# Patient Record
Sex: MALE | Race: WHITE | Employment: OTHER | URBAN - METROPOLITAN AREA
[De-identification: names, ages, dates, MRNs, and addresses within clinical notes are randomized per-mention and may not be internally consistent; named-entity substitution may affect disease eponyms.]

---

## 2021-01-06 NOTE — PROGRESS NOTES
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 Present Illness: Patient is a 79 y.o.  male presents to his dermatologist with a mole on the scalp. Lesion has been present for 6 months. It has recently unchanged in size. It has been bleeding. The lesion has not been pruritic. Patient does not have any other lesions of concern. 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 Patient does have a personal history of melanoma. Oly Limb does not have significant subcutaneous mass, swelling in the neck or axilla, cough, abdominal pain, jaundice, blood in stools, headaches, recent neurological changes or bone pain to indicate any metastatic disease. Past Medical History:   Diagnosis Date    Hernia     Leukemia ([facility] [address]     Melanoma ([facility] [address]      Past Surgical History:   Procedure Laterality Date    BONE MARROW BIOPSY       Social  Social History     Tobacco Use    Smoking status: Never Smoker   Substance Use Topics    Alcohol use: Not on file    Drug use: Not on file     Family History   Problem Relation Age of Onset    Cancer Brother     Parkinsonism Brother     Cancer Maternal Grandfather     Cancer Paternal Grandfather      Allergies: Patient has no known allergies. Current Outpatient Medications   Medication Sig Dispense Refill    aspirin 81 MG tablet Take 81 mg by mouth daily.  lisinopril (PRINIVIL;ZESTRIL) 20 MG tablet Take 20 mg by mouth daily.  metoprolol (TOPROL-XL) 100 MG XL tablet Take 100 mg by mouth daily. No current facility-administered medications for this visit. Review of Systems: See HPI, all other systems reviewed and are negative.     Physical Examination:    BP (!) 144/73 (Site: Right Upper Arm, Position: Sitting, Cuff Size: Small Adult)   Pulse 60   Temp 97.3 °F (36.3 °C) (Temporal)   Ht 6' 3\" (1.905 m)   Wt 259 lb (117.5 kg)   SpO2 99%   BMI 32.37 kg/m²     General:  Alert, oriented x 3, cooperative, no distress, appears stated age. Skin: Skin color, texture, turgor normal. There is a biopsy site on his frontal scalp. There are no surrounding areas of nodularity or pigmentation. Other suspicious skin lesions: no.   Lymph nodes: Cervical, supraclavicular, and axillary nodes normal.   HENT:  Normocephalic, without obvious abnormality. Moist mucus membranes. No icterus. Neck: Supple, symmetrical, trachea midline, no thyromegaly. Back:   Symmetric, no curvature. No CVA tenderness. Lungs:   No respiratory distress. Chest wall:  No tenderness or deformity. No chest pain or S. O.B   Heart:  Regular rate and rhythm by peripheral pulses. Abdomen:   Soft, non-tender. No masses,  No organomegaly. Extremities: Extremities normal, atraumatic, no cyanosis or bilateral edema. Neurologic: Grossly intact sensory and motor exam.   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 Plan: The natural history, prognosis and treatment of leiomyosarcoma were discussed. The discussion included rarity of tumor. The discussion of the treatment included lack of clear standard of care complete discussion. It was felt that this discussion was necessary because of the anxiety induced by the diagnosis and the complex information that the patient receive when they attempt to educate themselves by the public sources and/or the internet. The lesion can be treated with: Wide Excision under anesthesia. I explained patient about the surgery. All the complications including wound issues were explained. Risks, benefits and alternatives of surgery explained to the patient and patient wishes to proceed with surgery.  Adjuvant management will depend on final pathology. All the questions of the patient are answered to his apparent satisfaction. Patient verbalized understanding of the management plan. Pathology reviewed with the patient and his wife. Discussed possibly obtaining an MRI of the scalp. Discussed a CT scan of the neck and Chest.  Discussed obtaining a pre-op physical.  Follow up with Dr. Dave Aldridge. Susan Velez MD  Surgery Attending    I, Lionel Jones RN, am scribing for and in the presence of Dr Susan Velez. Lionel Jones RN    I, Dr. Susan Velez, personally performed the services described in this documentation as scribed by Lionel Jones RN in my presence, and it is both accurate and complete.      Susan Velez MD  Surgery Attending

## 2021-01-08 ENCOUNTER — OFFICE VISIT (OUTPATIENT)
Dept: SURGERY | Age: 71
End: 2021-01-08
Payer: MEDICARE

## 2021-01-08 VITALS
HEART RATE: 60 BPM | WEIGHT: 259 LBS | OXYGEN SATURATION: 99 % | TEMPERATURE: 97.3 F | HEIGHT: 75 IN | BODY MASS INDEX: 32.2 KG/M2 | SYSTOLIC BLOOD PRESSURE: 144 MMHG | DIASTOLIC BLOOD PRESSURE: 73 MMHG

## 2021-01-08 DIAGNOSIS — C80.1 MALIGNANT SPINDLE CELL NEOPLASM (HCC): Primary | ICD-10-CM

## 2021-01-08 PROCEDURE — 99205 OFFICE O/P NEW HI 60 MIN: CPT | Performed by: SURGERY

## 2021-01-08 NOTE — LETTER
[facility]) Surgical Oncology and General Surgery   [address] [place] [address] [phone] MD Haider    SURGERY ORDER -- 21    Facility:  [facility]. # _________________                                  Scheduled by: ____________    Surgery Date & Time: Friday January 15 th 10:00                                             Nuc Med / Inj. - or - Needle/Seed Loc:                    Pt arrival:  08:00    Patient Name: Roxy Olivares             :                1950           PCP:                 Yasmin Parker DO   Home Ph:         [phone] (home)                                                     PROCEDURE: Scalp sarcoma resection possible full thickness skin graft. [id number], [id number]    DIAGNOSIS:     ICD-10-CM    1. Malignant spindle cell neoplasm (HCC)  C80.1      Anesthesia: _GA_ Time Needed: _90 mins_Pt Position: _supine_         Outpatient _Y_ Admit __  Assist._____  Pre-Op H & P to be done by: PCP      Labs Needed: CBC [] PT/PTT []  INR [] CMP [] EKG [] CXR [] Urine Hcg []     Cardiac Clearance ___  (if Xd  to be done by) __________________    Medications to be stopped 5 days before surgery: Aspirin    Additional/Special Orders:    Ancef 2gm IV risa Watson MD  2021 10:03 AM

## 2021-01-11 NOTE — PROGRESS NOTES
[facility] / [facility] ([facility]) [address] [facility], [address]    Acknowledgment of Informed Consent for Surgical or Medical Procedure and Sedation  I agree to allow doctor(s) Elle Willett and his/her associates or assistants, including residents and/or other qualified medical practitioner to perform the following medical treatment or procedure and to administer or direct the administration of sedation as necessary:  Procedure(s): SCALP SARCOMA RESECTION POSSIBLE FULL THICKNESS SKIN GRAFT  My doctor has explained the following regarding the proposed procedure:  ? the explanation of the procedure  ? the benefits of the procedure  ? the potential problems that might occur during recuperation  ? the risks and side effects of the procedure which could include but are not limited to severe blood loss, infection, stroke or death  ? the benefits, risks and side effect of alternative procedures including the consequences of declining this procedure or any alternative procedures  ? the likelihood of achieving satisfactory results. I acknowledge no guarantee or assurance has been made to me regarding the results. I understand that during the course of this treatment/procedure, unforeseen conditions can occur which require an additional or different procedure. I agree to allow my physician or assistants to perform such extension of the original procedure as they may find necessary. I understand that sedation will often result in temporary impairment of memory and fine motor skills and that sedation can occasionally progress to a state of deep sedation or general anesthesia. I understand the risks of anesthesia for surgery include, but are not limited to, sore throat, hoarseness, injury to face, mouth, or teeth; nausea; headache; injury to blood vessels or nerves; death, brain damage, or paralysis.

## 2021-01-12 ENCOUNTER — OFFICE VISIT (OUTPATIENT)
Dept: PRIMARY CARE CLINIC | Age: 71
End: 2021-01-12
Payer: MEDICARE

## 2021-01-12 DIAGNOSIS — Z01.818 PRE-OP EXAMINATION: Primary | ICD-10-CM

## 2021-01-12 LAB — SARS-COV-2: NOT DETECTED

## 2021-01-12 PROCEDURE — 99421 OL DIG E/M SVC 5-10 MIN: CPT | Performed by: NURSE PRACTITIONER

## 2021-01-12 RX ORDER — METOPROLOL SUCCINATE 50 MG/1
TABLET, EXTENDED RELEASE ORAL
COMMUNITY
Start: 2020-11-28

## 2021-01-12 RX ORDER — LISINOPRIL AND HYDROCHLOROTHIAZIDE 25; 20 MG/1; MG/1
TABLET ORAL
COMMUNITY
Start: 2020-11-28

## 2021-01-12 NOTE — PROGRESS NOTES
Pt to be discharge to wife or son. Pt had COVID test 1/12 and states will quarantine until after surgery.

## 2021-01-12 NOTE — PROGRESS NOTES
4. Please call [phone] option #2 option #2 if you have not been preregistered yet. On the day of your procedure bring your insurance card and photo ID. You will be registered at your bedside once brought back to your room. 5. DO NOT EAT ANYTHING eight hours prior to surgery. May have 8 ounces of water 4 hours prior to surgery. 6. MEDICATIONS   ? Take the following medications with a SMALL sip of water:   ? Use your usual dose of inhalers the morning of surgery. BRING your rescue inhaler with you to hospital.   ? Anesthesia does NOT want you to take insulin the morning of surgery. They will control your blood sugar while you are at the hospital. Please contact your ordering physician for instructions regarding your insulin the night before your procedure. If you have an insulin pump, please keep it set on basal rate. 7. Do not swallow water when brushing teeth. No gum, candy, mints or ice chips. Refrain from smoking or at least decrease the amount. 8. Dress in loose, comfortable clothing appropriate for redressing after your procedure. Do not wear jewelry (including body piercings), make-up (especially NO eye make-up), fingernail polish (NO toenail polish if foot/leg surgery), lotion, powders or metal hairclips. 9. Dentures, glasses, or contacts will need to be removed before your procedure. Bring cases for your glasses, contacts, dentures, or hearing aids to protect them while you are in surgery. 10. If you use a CPAP, please bring it with you on the day of your procedure. 11. We recommend that valuable personal  belongings such as cash, cell phones, e-tablets or jewelry, be left at home during your stay. The hospital will not be responsible for valuables that are not secured in the hospital safe. However, if your insurance requires a co-pay, you may want to bring a method of payment, i.e. Check or credit card, if you wish to pay your co-pay the day of surgery. 12. If you are to stay overnight, you may bring a bag with personal items. Please have any large items you may need brought in by your family after your arrival to your hospital room. 15. If you have a Living Will or Durable Power of , please bring a copy on the day of your procedure. 15. With your permission, one family member may accompany you while you are being prepared for surgery. Once you are ready, additional family members may join you. HOW WE KEEP YOU SAFE and WORK TO PREVENT SURGICAL SITE INFECTIONS:  1. Health care workers should always check your ID bracelet to verify your name and birth date. You will be asked many times to state your name, date of birth, and allergies. 2. Health care workers should always clean their hands with soap or alcohol gel before providing care to you. It is okay to ask anyone if they cleaned their hands before they touch you. 3. You will be actively involved in verifying the type of procedure you are having and ensuring the correct surgical site. This will be confirmed multiple times prior to your procedure. Do NOT nohelia your surgery site UNLESS instructed to by your surgeon. 4. Do not shave or wax for 72 hours prior to procedure near your operative site. Shaving with a razor can irritate your skin and make it easier to develop an infection. On the day of your procedure, any hair that needs to be removed near the surgical site will be clipped by a healthcare worker using a special clippers designed to avoid skin irritation. 5. When you are in the operating room, your surgical site will be cleansed with a special soap, and in most cases, you will be given an antibiotic before the surgery begins.       What to expect AFTER YOUR PROCEDURE:

## 2021-01-14 ENCOUNTER — ANESTHESIA EVENT (OUTPATIENT)
Dept: OPERATING ROOM | Age: 71
End: 2021-01-14
Payer: MEDICARE

## 2021-01-15 ENCOUNTER — HOSPITAL ENCOUNTER (OUTPATIENT)
Age: 71
Setting detail: OUTPATIENT SURGERY
Discharge: HOME | End: 2021-01-15
Attending: SURGERY | Admitting: SURGERY
Payer: MEDICARE

## 2021-01-15 ENCOUNTER — ANESTHESIA (OUTPATIENT)
Dept: OPERATING ROOM | Age: 71
End: 2021-01-15
Payer: MEDICARE

## 2021-01-15 VITALS
BODY MASS INDEX: 32.08 KG/M2 | RESPIRATION RATE: 18 BRPM | SYSTOLIC BLOOD PRESSURE: 108 MMHG | HEART RATE: 75 BPM | OXYGEN SATURATION: 97 % | TEMPERATURE: 96.2 F | DIASTOLIC BLOOD PRESSURE: 71 MMHG | HEIGHT: 75 IN | WEIGHT: 258 LBS

## 2021-01-15 VITALS
TEMPERATURE: 97.3 F | RESPIRATION RATE: 18 BRPM | OXYGEN SATURATION: 98 % | DIASTOLIC BLOOD PRESSURE: 65 MMHG | SYSTOLIC BLOOD PRESSURE: 108 MMHG

## 2021-01-15 DIAGNOSIS — C80.1 MALIGNANT TUMOR, SPINDLE CELL TYPE (HCC): ICD-10-CM

## 2021-01-15 PROCEDURE — 97605 NEG PRS WND THER DME<=50SQCM: CPT | Performed by: SURGERY

## 2021-01-15 PROCEDURE — 2709999900 HC NON-CHARGEABLE SUPPLY: Performed by: SURGERY

## 2021-01-15 PROCEDURE — 3600000004 HC SURGERY LEVEL 4 BASE: Performed by: SURGERY

## 2021-01-15 PROCEDURE — 13101 CMPLX RPR TRUNK 2.6-7.5 CM: CPT | Performed by: SURGERY

## 2021-01-15 PROCEDURE — 15240 FTH/GFT F/C/C/M/N/AX/G/H/F20: CPT | Performed by: SURGERY

## 2021-01-15 PROCEDURE — 3700000000 HC ANESTHESIA ATTENDED CARE: Performed by: SURGERY

## 2021-01-15 PROCEDURE — 2500000003 HC RX 250 WO HCPCS: Performed by: SURGERY

## 2021-01-15 PROCEDURE — 2580000003 HC RX 258: Performed by: ANESTHESIOLOGY

## 2021-01-15 PROCEDURE — 88341 IMHCHEM/IMCYTCHM EA ADD ANTB: CPT

## 2021-01-15 PROCEDURE — 7100000001 HC PACU RECOVERY - ADDTL 15 MIN: Performed by: SURGERY

## 2021-01-15 PROCEDURE — 13102 CMPLX RPR TRUNK ADDL 5CM/<: CPT | Performed by: SURGERY

## 2021-01-15 PROCEDURE — 2580000003 HC RX 258: Performed by: SURGERY

## 2021-01-15 PROCEDURE — 6360000002 HC RX W HCPCS: Performed by: NURSE ANESTHETIST, CERTIFIED REGISTERED

## 2021-01-15 PROCEDURE — 88342 IMHCHEM/IMCYTCHM 1ST ANTB: CPT

## 2021-01-15 PROCEDURE — 15241 FTH/GFT F/C/C/M/N/A/G/H/F EA: CPT | Performed by: SURGERY

## 2021-01-15 PROCEDURE — 2500000003 HC RX 250 WO HCPCS: Performed by: NURSE ANESTHETIST, CERTIFIED REGISTERED

## 2021-01-15 PROCEDURE — 6360000002 HC RX W HCPCS: Performed by: SURGERY

## 2021-01-15 PROCEDURE — 7100000011 HC PHASE II RECOVERY - ADDTL 15 MIN: Performed by: SURGERY

## 2021-01-15 PROCEDURE — 3700000001 HC ADD 15 MINUTES (ANESTHESIA): Performed by: SURGERY

## 2021-01-15 PROCEDURE — 2720000010 HC SURG SUPPLY STERILE: Performed by: SURGERY

## 2021-01-15 PROCEDURE — 21016 RESECT FACE/SCALP TUM 2 CM/>: CPT | Performed by: SURGERY

## 2021-01-15 PROCEDURE — 88305 TISSUE EXAM BY PATHOLOGIST: CPT

## 2021-01-15 PROCEDURE — 7100000000 HC PACU RECOVERY - FIRST 15 MIN: Performed by: SURGERY

## 2021-01-15 PROCEDURE — 7100000010 HC PHASE II RECOVERY - FIRST 15 MIN: Performed by: SURGERY

## 2021-01-15 PROCEDURE — 3600000014 HC SURGERY LEVEL 4 ADDTL 15MIN: Performed by: SURGERY

## 2021-01-15 RX ORDER — SODIUM CHLORIDE 0.9 % (FLUSH) 0.9 %
10 SYRINGE (ML) INJECTION EVERY 12 HOURS SCHEDULED
Status: DISCONTINUED | OUTPATIENT
Start: 2021-01-15 | End: 2021-01-15 | Stop reason: HOSPADM

## 2021-01-15 RX ORDER — SODIUM CHLORIDE 0.9 % (FLUSH) 0.9 %
10 SYRINGE (ML) INJECTION PRN
Status: DISCONTINUED | OUTPATIENT
Start: 2021-01-15 | End: 2021-01-15 | Stop reason: HOSPADM

## 2021-01-15 RX ORDER — SODIUM CHLORIDE 9 MG/ML
INJECTION, SOLUTION INTRAVENOUS CONTINUOUS
Status: DISCONTINUED | OUTPATIENT
Start: 2021-01-15 | End: 2021-01-15 | Stop reason: HOSPADM

## 2021-01-15 RX ORDER — MAGNESIUM HYDROXIDE 1200 MG/15ML
LIQUID ORAL CONTINUOUS PRN
Status: COMPLETED | OUTPATIENT
Start: 2021-01-15 | End: 2021-01-15

## 2021-01-15 RX ORDER — FENTANYL CITRATE 50 UG/ML
INJECTION, SOLUTION INTRAMUSCULAR; INTRAVENOUS PRN
Status: DISCONTINUED | OUTPATIENT
Start: 2021-01-15 | End: 2021-01-15 | Stop reason: SDUPTHER

## 2021-01-15 RX ORDER — HYDRALAZINE HYDROCHLORIDE 20 MG/ML
5 INJECTION INTRAMUSCULAR; INTRAVENOUS EVERY 5 MIN PRN
Status: CANCELLED | OUTPATIENT
Start: 2021-01-15

## 2021-01-15 RX ORDER — CEFAZOLIN SODIUM 2 G/50ML
2 SOLUTION INTRAVENOUS ONCE
Status: COMPLETED | OUTPATIENT
Start: 2021-01-15 | End: 2021-01-15

## 2021-01-15 RX ORDER — SODIUM CHLORIDE, SODIUM LACTATE, POTASSIUM CHLORIDE, CALCIUM CHLORIDE 600; 310; 30; 20 MG/100ML; MG/100ML; MG/100ML; MG/100ML
INJECTION, SOLUTION INTRAVENOUS CONTINUOUS
Status: DISCONTINUED | OUTPATIENT
Start: 2021-01-15 | End: 2021-01-15 | Stop reason: HOSPADM

## 2021-01-15 RX ORDER — LIDOCAINE HYDROCHLORIDE 10 MG/ML
1 INJECTION, SOLUTION EPIDURAL; INFILTRATION; INTRACAUDAL; PERINEURAL
Status: DISCONTINUED | OUTPATIENT
Start: 2021-01-15 | End: 2021-01-15 | Stop reason: HOSPADM

## 2021-01-15 RX ORDER — PROPOFOL 10 MG/ML
INJECTION, EMULSION INTRAVENOUS PRN
Status: DISCONTINUED | OUTPATIENT
Start: 2021-01-15 | End: 2021-01-15 | Stop reason: SDUPTHER

## 2021-01-15 RX ORDER — FENTANYL CITRATE 50 UG/ML
50 INJECTION, SOLUTION INTRAMUSCULAR; INTRAVENOUS EVERY 5 MIN PRN
Status: CANCELLED | OUTPATIENT
Start: 2021-01-15

## 2021-01-15 RX ORDER — ONDANSETRON 2 MG/ML
INJECTION INTRAMUSCULAR; INTRAVENOUS PRN
Status: DISCONTINUED | OUTPATIENT
Start: 2021-01-15 | End: 2021-01-15 | Stop reason: SDUPTHER

## 2021-01-15 RX ORDER — LABETALOL 20 MG/4 ML (5 MG/ML) INTRAVENOUS SYRINGE
5 EVERY 10 MIN PRN
Status: CANCELLED | OUTPATIENT
Start: 2021-01-15

## 2021-01-15 RX ORDER — OXYCODONE HYDROCHLORIDE AND ACETAMINOPHEN 5; 325 MG/1; MG/1
1 TABLET ORAL EVERY 6 HOURS PRN
Qty: 16 TABLET | Refills: 0 | Status: SHIPPED | OUTPATIENT
Start: 2021-01-15 | End: 2021-01-19

## 2021-01-15 RX ORDER — GLYCOPYRROLATE 0.2 MG/ML
INJECTION INTRAMUSCULAR; INTRAVENOUS PRN
Status: DISCONTINUED | OUTPATIENT
Start: 2021-01-15 | End: 2021-01-15 | Stop reason: SDUPTHER

## 2021-01-15 RX ORDER — ENALAPRILAT 2.5 MG/2ML
1.25 INJECTION INTRAVENOUS
Status: CANCELLED | OUTPATIENT
Start: 2021-01-15 | End: 2021-01-15

## 2021-01-15 RX ORDER — ROCURONIUM BROMIDE 10 MG/ML
INJECTION, SOLUTION INTRAVENOUS PRN
Status: DISCONTINUED | OUTPATIENT
Start: 2021-01-15 | End: 2021-01-15 | Stop reason: SDUPTHER

## 2021-01-15 RX ORDER — LIDOCAINE HYDROCHLORIDE 20 MG/ML
INJECTION, SOLUTION INFILTRATION; PERINEURAL PRN
Status: DISCONTINUED | OUTPATIENT
Start: 2021-01-15 | End: 2021-01-15 | Stop reason: SDUPTHER

## 2021-01-15 RX ORDER — SUCCINYLCHOLINE CHLORIDE 20 MG/ML
INJECTION INTRAMUSCULAR; INTRAVENOUS PRN
Status: DISCONTINUED | OUTPATIENT
Start: 2021-01-15 | End: 2021-01-15 | Stop reason: SDUPTHER

## 2021-01-15 RX ORDER — FENTANYL CITRATE 50 UG/ML
25 INJECTION, SOLUTION INTRAMUSCULAR; INTRAVENOUS EVERY 5 MIN PRN
Status: CANCELLED | OUTPATIENT
Start: 2021-01-15

## 2021-01-15 RX ORDER — DEXAMETHASONE SODIUM PHOSPHATE 4 MG/ML
INJECTION, SOLUTION INTRA-ARTICULAR; INTRALESIONAL; INTRAMUSCULAR; INTRAVENOUS; SOFT TISSUE PRN
Status: DISCONTINUED | OUTPATIENT
Start: 2021-01-15 | End: 2021-01-15 | Stop reason: SDUPTHER

## 2021-01-15 RX ORDER — BUPIVACAINE HYDROCHLORIDE AND EPINEPHRINE 5; 5 MG/ML; UG/ML
INJECTION, SOLUTION EPIDURAL; INTRACAUDAL; PERINEURAL PRN
Status: DISCONTINUED | OUTPATIENT
Start: 2021-01-15 | End: 2021-01-15 | Stop reason: ALTCHOICE

## 2021-01-15 RX ORDER — ONDANSETRON 2 MG/ML
4 INJECTION INTRAMUSCULAR; INTRAVENOUS
Status: CANCELLED | OUTPATIENT
Start: 2021-01-15 | End: 2021-01-15

## 2021-01-15 RX ADMIN — SODIUM CHLORIDE, POTASSIUM CHLORIDE, SODIUM LACTATE AND CALCIUM CHLORIDE: 600; 310; 30; 20 INJECTION, SOLUTION INTRAVENOUS at 08:42

## 2021-01-15 RX ADMIN — PHENYLEPHRINE HYDROCHLORIDE 80 MCG: 10 INJECTION, SOLUTION INTRAMUSCULAR; INTRAVENOUS; SUBCUTANEOUS at 10:29

## 2021-01-15 RX ADMIN — ONDANSETRON 4 MG: 2 INJECTION INTRAMUSCULAR; INTRAVENOUS at 10:24

## 2021-01-15 RX ADMIN — DEXAMETHASONE SODIUM PHOSPHATE 8 MG: 4 INJECTION, SOLUTION INTRAMUSCULAR; INTRAVENOUS at 10:24

## 2021-01-15 RX ADMIN — PHENYLEPHRINE HYDROCHLORIDE 80 MCG: 10 INJECTION, SOLUTION INTRAMUSCULAR; INTRAVENOUS; SUBCUTANEOUS at 11:26

## 2021-01-15 RX ADMIN — SUGAMMADEX 234 MG: 100 INJECTION, SOLUTION INTRAVENOUS at 11:37

## 2021-01-15 RX ADMIN — ROCURONIUM BROMIDE 20 MG: 10 INJECTION INTRAVENOUS at 10:24

## 2021-01-15 RX ADMIN — FAMOTIDINE 20 MG: 10 INJECTION, SOLUTION INTRAVENOUS at 10:24

## 2021-01-15 RX ADMIN — SODIUM CHLORIDE, POTASSIUM CHLORIDE, SODIUM LACTATE AND CALCIUM CHLORIDE: 600; 310; 30; 20 INJECTION, SOLUTION INTRAVENOUS at 11:45

## 2021-01-15 RX ADMIN — PHENYLEPHRINE HYDROCHLORIDE 100 MCG: 10 INJECTION, SOLUTION INTRAMUSCULAR; INTRAVENOUS; SUBCUTANEOUS at 10:48

## 2021-01-15 RX ADMIN — GLYCOPYRROLATE 0.4 MG: 0.2 INJECTION INTRAMUSCULAR; INTRAVENOUS at 10:44

## 2021-01-15 RX ADMIN — ROCURONIUM BROMIDE 30 MG: 10 INJECTION INTRAVENOUS at 10:39

## 2021-01-15 RX ADMIN — PROPOFOL 200 MG: 10 INJECTION, EMULSION INTRAVENOUS at 10:24

## 2021-01-15 RX ADMIN — FENTANYL CITRATE 200 MCG: 50 INJECTION INTRAMUSCULAR; INTRAVENOUS at 10:29

## 2021-01-15 RX ADMIN — GLYCOPYRROLATE 0.2 MG: 0.2 INJECTION INTRAMUSCULAR; INTRAVENOUS at 10:29

## 2021-01-15 RX ADMIN — SUCCINYLCHOLINE CHLORIDE 200 MG: 20 INJECTION, SOLUTION INTRAMUSCULAR; INTRAVENOUS; PARENTERAL at 10:26

## 2021-01-15 RX ADMIN — CEFAZOLIN SODIUM 2 G: 2 SOLUTION INTRAVENOUS at 10:36

## 2021-01-15 RX ADMIN — LIDOCAINE HYDROCHLORIDE 100 MG: 20 INJECTION, SOLUTION INFILTRATION; PERINEURAL at 10:24

## 2021-01-15 RX ADMIN — PROPOFOL 100 MG: 10 INJECTION, EMULSION INTRAVENOUS at 11:26

## 2021-01-15 RX ADMIN — GLYCOPYRROLATE 0.2 MG: 0.2 INJECTION INTRAMUSCULAR; INTRAVENOUS at 11:00

## 2021-01-15 RX ADMIN — PHENYLEPHRINE HYDROCHLORIDE 80 MCG: 10 INJECTION, SOLUTION INTRAMUSCULAR; INTRAVENOUS; SUBCUTANEOUS at 10:38

## 2021-01-15 RX ADMIN — PHENYLEPHRINE HYDROCHLORIDE 100 MCG: 10 INJECTION, SOLUTION INTRAMUSCULAR; INTRAVENOUS; SUBCUTANEOUS at 11:00

## 2021-01-15 ASSESSMENT — PULMONARY FUNCTION TESTS
PIF_VALUE: 21
PIF_VALUE: 18
PIF_VALUE: 20
PIF_VALUE: 17
PIF_VALUE: 19
PIF_VALUE: 27
PIF_VALUE: 3
PIF_VALUE: 18
PIF_VALUE: 19
PIF_VALUE: 24
PIF_VALUE: 16
PIF_VALUE: 20
PIF_VALUE: 17
PIF_VALUE: 20
PIF_VALUE: 20
PIF_VALUE: 19
PIF_VALUE: 21
PIF_VALUE: 20
PIF_VALUE: 21
PIF_VALUE: 21
PIF_VALUE: 20
PIF_VALUE: 18
PIF_VALUE: 18
PIF_VALUE: 21
PIF_VALUE: 18
PIF_VALUE: 20
PIF_VALUE: 6
PIF_VALUE: 18
PIF_VALUE: 19
PIF_VALUE: 18
PIF_VALUE: 21
PIF_VALUE: 21
PIF_VALUE: 20
PIF_VALUE: 21
PIF_VALUE: 18
PIF_VALUE: 18
PIF_VALUE: 20
PIF_VALUE: 19
PIF_VALUE: 9
PIF_VALUE: 18
PIF_VALUE: 19
PIF_VALUE: 19
PIF_VALUE: 20
PIF_VALUE: 19
PIF_VALUE: 19
PIF_VALUE: 41

## 2021-01-15 ASSESSMENT — PAIN SCALES - GENERAL: PAINLEVEL_OUTOF10: 0

## 2021-01-15 NOTE — H&P
Maximo Augustine    [phone]    [facility] Same Day Surgery Update H & P  Department of General Surgery   Surgical Service   Pre-operative History and Physical  Last H & P within the last 30 days. DIAGNOSIS:   Malignant tumor, spindle cell type ([address] [C80.1]    Procedure(s):  SCALP SARCOMA RESECTION POSSIBLE FULL THICKNESS SKIN GRAFT     HISTORY OF PRESENT ILLNESS:   Patient with malignant dermal spindle cell neoplasm of the scalp presents today for the above procedure. Covid 19:  Patient denies fever, chills, cough or known exposure to Covid-19. Past Medical History:        Diagnosis Date    Hernia     Hypertension     Leukemia ([address]     Melanoma ([facility] [address]     Sleep apnea     uses CPAP    Tachycardia     Stress induced     Past Surgical History:        Procedure Laterality Date    ACHILLES TENDON SURGERY      BONE MARROW BIOPSY      COLONOSCOPY      HERNIA REPAIR      Inguinal    SHOULDER SURGERY Right     TOE SURGERY Left     TONSILLECTOMY       Past Social History:  Social History     Socioeconomic History    Marital status:      Spouse name: None    Number of children: None    Years of education: None    Highest education level: None   Occupational History    None   Social Needs    Financial resource strain: None    Food insecurity     Worry: None     Inability: None    Transportation needs     Medical: None     Non-medical: None   Tobacco Use    Smoking status: Never Smoker    Smokeless tobacco: Never Used   Substance and Sexual Activity    Alcohol use:  Yes     Alcohol/week: 1.0 standard drinks     Types: 1 Cans of beer per week     Comment: Daily    Drug use: Never    Sexual activity: None   Lifestyle    Physical activity     Days per week: None     Minutes per session: None    Stress: None   Relationships    Social connections     Talks on phone: None     Gets together: None     Attends [employer] service: None     Active member of club or organization: None

## 2021-01-15 NOTE — PROGRESS NOTES
Pt arrived form OR, s/p SCALP SARCOMA RADICAL RESECTION, FULL THICKNESS SKIN GRAFT, report received from CRNA and surgical resident.   Hob elevated, ice chips given

## 2021-01-15 NOTE — ANESTHESIA PRE PROCEDURE
Department of Anesthesiology  Preprocedure Note       Name:  Barba Schlatter   Age:  79 y.o.  :  1950                                          MRN:  [id number]         Date:  1/15/2021      Surgeon: Perez Arias):  Halle Lovell MD    Procedure: Procedure(s):  SCALP SARCOMA RESECTION POSSIBLE FULL THICKNESS SKIN GRAFT    Medications prior to admission:   Prior to Admission medications    Medication Sig Start Date End Date Taking? Authorizing Provider   lisinopril-hydroCHLOROthiazide (PRINZIDE;ZESTORETIC) 20-25 MG per tablet  20  Yes Historical Provider, MD   metoprolol succinate (TOPROL XL) 50 MG extended release tablet  20  Yes Historical Provider, MD   aspirin 81 MG tablet Take 81 mg by mouth daily.    Yes Historical Provider, MD       Current medications:    Current Facility-Administered Medications   Medication Dose Route Frequency Provider Last Rate Last Admin    ceFAZolin (ANCEF) 2 g in dextrose 3 % 50 mL IVPB (duplex)  2 g Intravenous Once Halle Lovell MD        sodium chloride flush 0.9 % injection 10 mL  10 mL Intravenous 2 times per day Josué Dalia, DO        sodium chloride flush 0.9 % injection 10 mL  10 mL Intravenous PRN Josué Dalia, DO        0.9 % sodium chloride infusion   Intravenous Continuous Josué Dalia, DO        lactated ringers infusion   Intravenous Continuous Josué Dalia, DO        lactated ringers infusion   Intravenous Continuous Matti Cervantes MD        sodium chloride flush 0.9 % injection 10 mL  10 mL Intravenous 2 times per day Matti Cervantes MD        sodium chloride flush 0.9 % injection 10 mL  10 mL Intravenous PRN Matti Cervantes MD        lidocaine PF 1 % injection 1 mL  1 mL Intradermal Once PRN Matti Cervantes MD           Allergies:  No Known Allergies    Problem List:    Patient Active Problem List   Diagnosis Code    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       Past Medical History:        Diagnosis Date    Hernia     Hypertension     [facility] ([facility]     Melanoma ([facility]     Sleep apnea     uses CPAP    Tachycardia     Stress induced       Past Surgical History:        Procedure Laterality Date    ACHILLES TENDON SURGERY      BONE MARROW BIOPSY      COLONOSCOPY      HERNIA REPAIR      Inguinal    SHOULDER SURGERY Right     TOE SURGERY Left     TONSILLECTOMY         Social History:    Social History     Tobacco Use    Smoking status: Never Smoker    Smokeless tobacco: Never Used   Substance Use Topics    Alcohol use: Yes     Alcohol/week: 1.0 standard drinks     Types: 1 Cans of beer per week     Comment: Daily                                Counseling given: Not Answered      Vital Signs (Current):   Vitals:    01/12/21 1314 01/15/21 0758   BP:  (!) 163/80   Pulse:  61   Resp:  17   Temp:  97.7 °F (36.5 °C)   TempSrc:  Oral   SpO2:  96%   Weight: 258 lb (117 kg) 258 lb (117 kg)   Height: 6' 3\" (1.905 m) 6' 3\" (1.905 m)                                              BP Readings from Last 3 Encounters:   01/15/21 (!) 163/80   01/08/21 (!) 144/73   05/15/17 122/64       NPO Status:                                                                                 BMI:   Wt Readings from Last 3 Encounters:   01/15/21 258 lb (117 kg)   01/08/21 259 lb (117.5 kg)   05/15/17 250 lb (113.4 kg)     Body mass index is 32.25 kg/m².     CBC:   Lab Results   Component Value Date    WBC 5.7 05/15/2017    RBC 4.51 05/15/2017    HGB 13.5 05/15/2017    HCT 42.2 05/15/2017    MCV 93.6 05/15/2017    RDW 12.9 05/15/2017     05/15/2017       CMP:   Lab Results   Component Value Date     05/15/2017    K 3.6 05/15/2017     05/15/2017    CO2 30 05/15/2017    BUN 13 05/15/2017    CREATININE 0.82 05/15/2017    AGRATIO 1.4 05/15/2017    LABGLOM >60.0 05/15/2017    GLUCOSE 115 05/15/2017    PROT 6.9 05/15/2017    CALCIUM 9.1 05/15/2017    BILITOT 1.2 05/15/2017    ALKPHOS 46 05/15/2017    AST 20 05/15/2017    ALT 27 05/15/2017       POC Tests: No

## 2021-01-15 NOTE — BRIEF OP NOTE
Brief Postoperative Note      Patient: Coni Lizarraga  YOB: 1950  MRN: [id number]    Date of Procedure: 1/15/2021    Pre-Op Diagnosis: Malignant tumor, spindle cell type ([facility] [address] [C80.1]    Post-Op Diagnosis: Same       Procedure(s):  SCALP SARCOMA RADICAL RESECTION, FULL THICKNESS SKIN GRAFT    Surgeon(s):  Franca Rowell MD    Assistant:  Resident: Chano Ya MD; Natalie Tejeda DO; Davina Mcclelland MD; Claritza Kelly MD    Anesthesia: General    Estimated Blood Loss (mL): less than 50     Complications: None    Specimens:   ID Type Source Tests Collected by Time Destination   A : SCALP SARCOMA (SHORT ANTERIOR, LONG LEFT LATERAL) Tissue Tissue SURGICAL PATHOLOGY Franca Rowell MD 1/15/2021 1059        Implants:  * No implants in log *      Drains:   Negative Pressure Wound Therapy  Anterior; Upper (Active)       Findings: see op note    Electronically signed by Claritza Kelly MD on 1/15/2021 at 11:34 AM

## 2021-01-15 NOTE — PROGRESS NOTES
Ambulatory Surgery/Procedure Discharge Note    Vitals:    01/15/21 1319   BP: 108/71   Pulse: 75   Resp: 18   Temp: 96.2 °F (35.7 °C)   SpO2: 97%       No intake/output data recorded. Restroom use offered before discharge. yes    Pain assessment:    Pain Level: 0        Patient discharged to home/self care.  Patient discharged via wheel chair by transporter to waiting family/S.O.       1/15/2021 3:03 PM

## 2021-01-18 NOTE — PROGRESS NOTES
Marisel Costa is here for postop evaluation. He is gradually improving since then. Minimal pain now. No other complaints. O/E: Alert, oriented x 3, sitting comfortably with out any discomfort  No respiratory distress    Postop wound - healing well. Complete graft uptake. Path - FINAL DIAGNOSIS:     Skin of scalp, wide excision of previously diagnosed sarcoma:      - Biopsy site adjacent to superficial cutaneous spindled malignancy, 8        mm from inked deep resection margin, in sun damaged skin    A/P: S/P Scalp Sarcoma Radical Resection, Full Thickness Skin Graft 1/15/21, doing well  No postop complications  Path Pending  Follow up with Dr. Isaias Boone  Follow up in 3 months    Kareen Rocha MD  Surgery Attending    I, Jim Padilla RN, am scribing for and in the presence of Dr Kareen Rocha. Jim Padilla RN  I, Dr. Kareen Rocha, personally performed the services described in this documentation as scribed by Jim Padilla RN in my presence, and it is both accurate and complete.      Kareen Rocha MD  Surgery Attending

## 2021-01-19 ENCOUNTER — OFFICE VISIT (OUTPATIENT)
Dept: SURGERY | Age: 71
End: 2021-01-19

## 2021-01-19 VITALS
SYSTOLIC BLOOD PRESSURE: 157 MMHG | HEIGHT: 76 IN | OXYGEN SATURATION: 99 % | HEART RATE: 79 BPM | RESPIRATION RATE: 16 BRPM | WEIGHT: 260 LBS | TEMPERATURE: 96.8 F | BODY MASS INDEX: 31.66 KG/M2 | DIASTOLIC BLOOD PRESSURE: 72 MMHG

## 2021-01-19 DIAGNOSIS — C80.1 MALIGNANT SPINDLE CELL NEOPLASM (HCC): ICD-10-CM

## 2021-01-19 DIAGNOSIS — Z09 POSTOP CHECK: Primary | ICD-10-CM

## 2021-01-19 PROCEDURE — 99024 POSTOP FOLLOW-UP VISIT: CPT | Performed by: SURGERY

## 2021-01-19 RX ORDER — ZINC GLUCONATE 50 MG
50 TABLET ORAL DAILY
COMMUNITY
End: 2022-09-13

## 2021-01-19 RX ORDER — ACETAMINOPHEN 160 MG
TABLET,DISINTEGRATING ORAL
COMMUNITY

## 2021-01-19 RX ORDER — MULTIVIT WITH MINERALS/LUTEIN
250 TABLET ORAL DAILY
COMMUNITY

## 2021-01-22 NOTE — OP NOTE
Operative Note      Patient: Betzaida Delong  YOB: 1950  MRN: [id number]    Date of Procedure: 1/15/2021    Pre-Op Diagnosis: Malignant tumor, spindle cell type ([facility] [address] [C80.1]    Post-Op Diagnosis: Same       Procedure(s):  SCALP SARCOMA RADICAL RESECTION, FULL THICKNESS SKIN GRAFT    Surgeon(s):  Shona Bruno MD    Assistant:   Resident: Reinaldo Putnam MD; Yaa Villasenor DO; Carl Palma MD; Lainey Zayas MD    Anesthesia: General    Estimated Blood Loss (mL): less than 50     Complications: None    Specimens:   ID Type Source Tests Collected by Time Destination   A : SCALP SARCOMA (SHORT ANTERIOR, LONG LEFT LATERAL) Tissue Tissue SURGICAL PATHOLOGY Shona Bruno MD 1/15/2021 1059       INDICATIONS FOR OPERATION:  Discussed with patient about radical resection of the sarcoma. The risks of surgery include infection, bleeding, and recurrence of the lesion were explained. Patient verbalized understanding of the risks and benefits and wishes to proceed. DETAILS OF PROCEDURE: Patient was identified in the preoperative area and brought to the operating room. General anesthesia given. Operative site is prepped and draped in a sterile manner. Timeout procedure was performed confirming the procedure, site and administration of antibiotics. Lesion and margin marked for the excision. The sarcoma was measuring 1.3 cm x 1 cm. A margin of 2 cm was taken on all sides. Skin incised with scalpel. Incision deepened to subcutaneous tissues. Electrocautery used for further division of tissues. Dissection extended on to bone. All the soft tissue below tumor was excised and oriented with sutures. Specimen sent to pathology. Because of the location and size of the resection area, decision was made to use a full thickness skin graft. Impression of the resection area was taken with a glove cover. Impression was placed over skin in left lower abdomen and graft area marked out.  This area

## 2021-01-27 NOTE — PROGRESS NOTES
Irl Ours is here for postop evaluation. He is gradually improving since then. Minimal pain now. No other complaints. O/E: Alert, oriented x 3, sitting comfortably with out any discomfort  No respiratory distress    Postop wound - healing well. Path - FINAL DIAGNOSIS:     Skin of scalp, wide excision of previously diagnosed sarcoma:      - Biopsy site adjacent to superficial cutaneous spindled malignancy, 8        mm from inked deep resection margin, in sun damaged skin. ADDENDUM:     NO CHANGE IN FINAL DIAGNOSIS   UPDATED ANALYTIC INFORMATION ONLY   (SEE BELOW)     The tumor is positive for smooth muscle actin (SMA; diffuse and   moderate), and negative for [place], SOX-10, S100, desmin, and keratin   AE1+3/Cam 5.2, confirming the diagnosis of leiomyosarcoma. A/P: S/P Scalp Sarcoma Radical Resection, Full Thickness Skin Graft 1/15/21, doing well  No postop complications  Path as above reviewed with the patient  Follow up with Dr. Kellie Andujar  Follow up as scheduled    Douglas Simpson MD  Surgery Attending    I, Karen Wakefield RN, am scribing for and in the presence of Dr Douglas Simpson. Karen Wakefield RN  I, Dr. Douglas Simpson, personally performed the services described in this documentation as scribed by Karen Wakefield RN in my presence, and it is both accurate and complete.      Douglas Simpson MD  Surgery Attending

## 2021-02-02 ENCOUNTER — OFFICE VISIT (OUTPATIENT)
Dept: SURGERY | Age: 71
End: 2021-02-02

## 2021-02-02 ENCOUNTER — HOSPITAL ENCOUNTER (OUTPATIENT)
Dept: CT IMAGING | Age: 71
Discharge: HOME | End: 2021-02-02
Payer: MEDICARE

## 2021-02-02 VITALS
TEMPERATURE: 97.2 F | RESPIRATION RATE: 16 BRPM | DIASTOLIC BLOOD PRESSURE: 75 MMHG | HEART RATE: 52 BPM | OXYGEN SATURATION: 100 % | HEIGHT: 76 IN | WEIGHT: 262 LBS | BODY MASS INDEX: 31.9 KG/M2 | SYSTOLIC BLOOD PRESSURE: 134 MMHG

## 2021-02-02 DIAGNOSIS — Z09 POSTOP CHECK: ICD-10-CM

## 2021-02-02 DIAGNOSIS — C80.1 MALIGNANT SPINDLE CELL NEOPLASM (HCC): Primary | ICD-10-CM

## 2021-02-02 DIAGNOSIS — C91.41 HAIRY CELL LEUKEMIA, IN REMISSION (HCC): ICD-10-CM

## 2021-02-02 LAB
GFR AFRICAN AMERICAN: >60
GFR NON-AFRICAN AMERICAN: >60
PERFORMED ON: NORMAL
POC CREATININE: 1.1 MG/DL (ref 0.8–1.3)
POC SAMPLE TYPE: NORMAL

## 2021-02-02 PROCEDURE — 6360000004 HC RX CONTRAST MEDICATION: Performed by: INTERNAL MEDICINE

## 2021-02-02 PROCEDURE — 71260 CT THORAX DX C+: CPT

## 2021-02-02 PROCEDURE — 82565 ASSAY OF CREATININE: CPT

## 2021-02-02 PROCEDURE — 99024 POSTOP FOLLOW-UP VISIT: CPT | Performed by: SURGERY

## 2021-02-02 RX ADMIN — IOPAMIDOL 80 ML: 755 INJECTION, SOLUTION INTRAVENOUS at 07:32

## 2021-02-22 NOTE — PROGRESS NOTES
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 Denies fever currently. No other complaints. Review of systems is otherwise negative    Past medical history, Past surgical history, Social history, Family history and allergies reviewed and updated. Vitals:    02/23/21 1044   BP: 133/69   Site: Left Upper Arm   Position: Sitting   Cuff Size: Medium Adult   Pulse: 61   Resp: 16   Temp: 96.6 °F (35.9 °C)   TempSrc: Temporal   SpO2: 96%   Weight: 257 lb (116.6 kg)   Height: 6' 3.5\" (1.918 m)     Wt Readings from Last 3 Encounters:   02/23/21 257 lb (116.6 kg)   02/02/21 262 lb (118.8 kg)   01/19/21 260 lb (117.9 kg)     Body mass index is 31.7 kg/m². O/E:   Constitutional: Patient is oriented to person, place, and time. No distress. HENT: mucus membranes - moist. No scleral icterus. Neck: Supple and normal range of motion. No lymphadenopathy present. Pulmonary/Chest: Effort normal. No respiratory distress. Extremities: no edema and no tenderness. Neurological: Grossly intact motor and sensory exam  Skin: Skin is warm and dry. No rash noted. He is not diaphoretic. Psychiatric: He has a normal mood and affect. His behavior is normal. Judgment and thought content normal.     Scalp wound: graft is healing well. A/P:    Diagnosis Orders   1. Postop check     2. Malignant spindle cell neoplasm (HCC)       S/P Scalp Sarcoma Radical Resection, Full Thickness Skin Graft 1/15/21, doing well  No postop complications  Follow up with Dr. Leah Dan  Follow up with Dr. Salina Shields  Follow up with Dr. Taurus Wakefield  Follow up in 2 months     Nemesio Saldana MD  Surgery Attending    I, Jordan Macias RN, am scribing for and in the presence of Dr Nemesio Saldana. Jordan Macias RN    I, Dr. Nemesio Saldana, personally performed the services described in this documentation as scribed by Jordan Macias RN in my presence, and it is both accurate and complete. Moncho Nelson MD  Surgery Attending

## 2021-02-23 ENCOUNTER — OFFICE VISIT (OUTPATIENT)
Dept: SURGERY | Age: 71
End: 2021-02-23

## 2021-02-23 VITALS
WEIGHT: 257 LBS | HEIGHT: 76 IN | BODY MASS INDEX: 31.29 KG/M2 | HEART RATE: 61 BPM | SYSTOLIC BLOOD PRESSURE: 133 MMHG | OXYGEN SATURATION: 96 % | DIASTOLIC BLOOD PRESSURE: 69 MMHG | TEMPERATURE: 96.6 F | RESPIRATION RATE: 16 BRPM

## 2021-02-23 DIAGNOSIS — C80.1 MALIGNANT SPINDLE CELL NEOPLASM (HCC): ICD-10-CM

## 2021-02-23 DIAGNOSIS — Z09 POSTOP CHECK: Primary | ICD-10-CM

## 2021-02-23 PROCEDURE — 99024 POSTOP FOLLOW-UP VISIT: CPT | Performed by: SURGERY

## 2021-03-04 ENCOUNTER — TELEPHONE (OUTPATIENT)
Dept: SURGERY | Age: 71
End: 2021-03-04

## 2021-04-12 NOTE — PROGRESS NOTES
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 S/P Scalp Sarcoma Radical Resection, Full Thickness Skin Graft 1/15/21. Denies fever, cough, jaundice, headache, or areas of concern currently. No other complaints. Review of systems is otherwise negative     Past medical history, Past surgical history, Social history, Family history and allergies reviewed and updated. Vitals:    04/13/21 1031   BP: 134/67   Site: Left Upper Arm   Position: Sitting   Cuff Size: Medium Adult   Pulse: 50   Resp: 18   Temp: 97.6 °F (36.4 °C)   TempSrc: Oral   SpO2: 99%   Weight: 255 lb 9.6 oz (115.9 kg)   Height: 6' 3\" (1.905 m)     Wt Readings from Last 3 Encounters:   04/13/21 255 lb 9.6 oz (115.9 kg)   02/23/21 257 lb (116.6 kg)   02/02/21 262 lb (118.8 kg)     Body mass index is 31.95 kg/m². O/E:   Constitutional: Patient is oriented to person, place, and time. No distress. HENT: mucus membranes - moist. No scleral icterus. Graft healing well. Neck: Supple and normal range of motion. No lymphadenopathy present. Pulmonary/Chest: Effort normal. No respiratory distress. Abdominal: Soft. No distension and no mass. No tenderness. No Hepatosplenomegaly. Extremities: no edema and no tenderness. Neurological: Grossly intact motor and sensory exam  Skin: Skin is warm and dry. No rash noted. He is not diaphoretic. Psychiatric: He has a normal mood and affect. His behavior is normal. Judgment and thought content normal.     A/P:    Diagnosis Orders   1. Malignant spindle cell neoplasm ([address]     2. Malignant tumor, spindle cell type ([address]     3. Postop check       No postop complications  Follow up with Dr. Rosanna Krabbe  Follow up in 6 weeks     Rama Alexander MD  Surgery Attending    I, Julio Barrera RN, am scribing for and in the presence of Dr Rama Alexander.    Julio Barrera RN    I, Dr. Rama Alexander, personally performed the services described in this documentation as scribed by Sumanth Kendall RN in my presence, and it is both accurate and complete.      Norma Olivia MD  Surgery Attending

## 2021-04-13 ENCOUNTER — OFFICE VISIT (OUTPATIENT)
Dept: SURGERY | Age: 71
End: 2021-04-13

## 2021-04-13 VITALS
HEART RATE: 50 BPM | TEMPERATURE: 97.6 F | WEIGHT: 255.6 LBS | BODY MASS INDEX: 31.78 KG/M2 | OXYGEN SATURATION: 99 % | SYSTOLIC BLOOD PRESSURE: 134 MMHG | DIASTOLIC BLOOD PRESSURE: 67 MMHG | HEIGHT: 75 IN | RESPIRATION RATE: 18 BRPM

## 2021-04-13 DIAGNOSIS — Z09 POSTOP CHECK: ICD-10-CM

## 2021-04-13 DIAGNOSIS — C80.1 MALIGNANT TUMOR, SPINDLE CELL TYPE (HCC): ICD-10-CM

## 2021-04-13 DIAGNOSIS — C80.1 MALIGNANT SPINDLE CELL NEOPLASM (HCC): Primary | ICD-10-CM

## 2021-04-13 PROCEDURE — 99024 POSTOP FOLLOW-UP VISIT: CPT | Performed by: SURGERY

## 2021-05-25 ENCOUNTER — OFFICE VISIT (OUTPATIENT)
Dept: SURGERY | Age: 71
End: 2021-05-25
Payer: MEDICARE

## 2021-05-25 VITALS
DIASTOLIC BLOOD PRESSURE: 69 MMHG | WEIGHT: 255 LBS | TEMPERATURE: 97.2 F | BODY MASS INDEX: 31.71 KG/M2 | SYSTOLIC BLOOD PRESSURE: 131 MMHG | HEIGHT: 75 IN | HEART RATE: 50 BPM

## 2021-05-25 DIAGNOSIS — C80.1 MALIGNANT SPINDLE CELL NEOPLASM (HCC): Primary | ICD-10-CM

## 2021-05-25 PROCEDURE — 99214 OFFICE O/P EST MOD 30 MIN: CPT | Performed by: SURGERY

## 2021-08-23 DIAGNOSIS — C80.1 MALIGNANT SPINDLE CELL NEOPLASM (HCC): Primary | ICD-10-CM

## 2021-08-30 ENCOUNTER — HOSPITAL ENCOUNTER (OUTPATIENT)
Dept: CT IMAGING | Age: 71
Discharge: HOME | End: 2021-08-30
Payer: MEDICARE

## 2021-08-30 DIAGNOSIS — C80.1 MALIGNANT SPINDLE CELL NEOPLASM (HCC): ICD-10-CM

## 2021-08-30 LAB
GFR AFRICAN AMERICAN: >60
GFR NON-AFRICAN AMERICAN: >60
PERFORMED ON: NORMAL
POC CREATININE: 0.9 MG/DL (ref 0.8–1.3)
POC SAMPLE TYPE: NORMAL

## 2021-08-30 PROCEDURE — 6360000004 HC RX CONTRAST MEDICATION: Performed by: SURGERY

## 2021-08-30 PROCEDURE — 82565 ASSAY OF CREATININE: CPT

## 2021-08-30 PROCEDURE — 71260 CT THORAX DX C+: CPT

## 2021-08-30 RX ADMIN — IOPAMIDOL 80 ML: 755 INJECTION, SOLUTION INTRAVENOUS at 07:12

## 2021-08-30 NOTE — PROGRESS NOTES
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 S/P Scalp sarcoma resection possible full thickness skin graft 1/15/21. He is basically doing well. He denies new subcutaneous mass, headache, bone pain, cough, abdominal pain, or suspicious new lesions. He is being followed by Dr. Tico Rausch. No other complaints. Review of systems is otherwise negative. Patient endorses bilateral foot selling with tenderness. Was diagnosed with Gout. Prescribed Medicine but never started Medicine. Past medical history, Past surgical history, Social history, Family history and allergies reviewed and updated. Vitals:    08/31/21 0911   BP: 112/61   Pulse: 51   Temp: 98 °F (36.7 °C)   Weight: 253 lb 3.2 oz (114.9 kg)   Height: 6' 3\" (1.905 m)     Wt Readings from Last 3 Encounters:   08/31/21 253 lb 3.2 oz (114.9 kg)   05/25/21 255 lb (115.7 kg)   04/13/21 255 lb 9.6 oz (115.9 kg)     Body mass index is 31.65 kg/m². O/E:   Constitutional: Patient is oriented to person, place, and time. No distress. HENT: mucus membranes - moist. No scleral icterus. Incisional scar present to frotnal scalp. Neck: Supple and normal range of motion. No bilateral lymphadenopathy present. Pulmonary/Chest: Effort normal. No respiratory distress. Abdominal: Soft. No distension and no mass. No tenderness. No Hepatosplenomegaly. Extremities: no edema and no tenderness. Neurological: Grossly intact motor and sensory exam  Skin: Skin is warm and dry. No rash noted. He is not diaphoretic.   Left back scalp and left ear area, (Temporal) prominent area noticed  Surgical site:    Incision normal: Yes   Surrounding nodularity: No   Abnormal pigmentation: No   Other lesions: No  Lymph nodes: Palpable regional lymph nodes: No  Psychiatric: He has a normal mood and affect. His behavior is normal. Judgment and thought content normal.     CT Chest 8/30/21:     1.  No evidence of metastatic disease in the chest.     Incidental findings:  1.  Left adrenal fat-containing nodule statistically favor to represent an adrenal adenoma. A/P:    Diagnosis Orders   1. 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 . The chance of developing a new melanoma is present and thus importance of follow up with dermatologist is explained. Symptoms and signs of recurrence explained including headache, jaundice, cough, abdominal pain and blood in stools. Follow up in 6 months. Follow up with Dr. Colton Doherty MD  Surgery Attending    I, Charley Rinaldi RN, am scribing for and in the presence of Dr Scottie Reaves. Charley Rinaldi RN    I, Dr. Scottie Reaves, personally performed the services described in this documentation as scribed by Charley Rinaldi RN in my presence, and it is both accurate and complete.      Scottie Reaves MD  Surgery Attending

## 2021-08-31 ENCOUNTER — OFFICE VISIT (OUTPATIENT)
Dept: SURGERY | Age: 71
End: 2021-08-31
Payer: MEDICARE

## 2021-08-31 VITALS
TEMPERATURE: 98 F | HEIGHT: 75 IN | SYSTOLIC BLOOD PRESSURE: 112 MMHG | DIASTOLIC BLOOD PRESSURE: 61 MMHG | BODY MASS INDEX: 31.48 KG/M2 | HEART RATE: 51 BPM | WEIGHT: 253.2 LBS

## 2021-08-31 DIAGNOSIS — C80.1 MALIGNANT SPINDLE CELL NEOPLASM (HCC): Primary | ICD-10-CM

## 2021-08-31 PROCEDURE — 99213 OFFICE O/P EST LOW 20 MIN: CPT | Performed by: SURGERY

## 2022-02-22 NOTE — PROGRESS NOTES
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 S/P Scalp sarcoma resection possible full thickness skin graft 1/15/21. Last seen on 8/31/21. He continues doing well except he recently diagnosed with gout. He denies new subcutaneous mass, headache, bone pain, cough, abdominal pain, or suspicious new lesions. Patient has a history of Gout in the right foot  Patient is being followed by Dr. Heaven Cummings. No other complaints. Review of systems is otherwise negative. Patient endorses bilateral foot selling with tenderness. Past medical history, Past surgical history, Social history, Family history and allergies reviewed and updated. Vitals:    03/03/22 1003   BP: 137/82   Pulse: 57   Temp: 98 °F (36.7 °C)   Weight: 262 lb 6.4 oz (119 kg)   Height: 6' 3\" (1.905 m)     Wt Readings from Last 3 Encounters:   03/03/22 262 lb 6.4 oz (119 kg)   08/31/21 253 lb 3.2 oz (114.9 kg)   05/25/21 255 lb (115.7 kg)     Body mass index is 32.8 kg/m². O/E:   Constitutional: Patient is oriented to person, place, and time. No distress. HENT: mucus membranes - moist. No scleral icterus. Incisional scar present to frotnal scalp. Neck: Supple and normal range of motion. No bilateral lymphadenopathy present. Pulmonary/Chest: Effort normal. No respiratory distress. Abdominal: Soft. No distension and no mass. No tenderness. No Hepatosplenomegaly. Extremities: no edema and no tenderness. Neurological: Grossly intact motor and sensory exam  Skin: Skin is warm and dry. No rash noted. He is not diaphoretic.   Left back scalp and left ear area, (Temporal) prominent area noticed  Surgical site:    Incision normal: Yes   Surrounding nodularity: No   Abnormal pigmentation: No   Other lesions: No  Lymph nodes: Palpable regional lymph nodes: No  Psychiatric: He has a normal mood and affect. His behavior is normal. Judgment and thought content normal.    Hemoglobin A1C 1/10/22: 6.0    A/P:    Diagnosis Orders   1. Malignant spindle cell neoplasm ([facility] [address]     2. 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 The chance of developing a new melanoma is present and thus importance of follow up with dermatologist was reviewed. Symptoms and signs of recurrence reviewed including headache, jaundice, cough, abdominal pain and blood in stools. Follow up in 6 months with CT chest.  Follow up with Dr. Jennifer Duffy MD  Surgery Attending    I, Loli Hernandez RN, am scribing for and in the presence of Dr Olamide Chinchilla. Loli Hernandez RN  I, Dr. Olamide Chinchilal, personally performed the services described in this documentation as scribed by Loli Hernandez RN in my presence, and it is both accurate and complete.      Olamide Chinchilla MD  Surgery Attending

## 2022-03-03 ENCOUNTER — OFFICE VISIT (OUTPATIENT)
Dept: SURGERY | Age: 72
End: 2022-03-03
Payer: MEDICARE

## 2022-03-03 VITALS
DIASTOLIC BLOOD PRESSURE: 82 MMHG | HEART RATE: 57 BPM | BODY MASS INDEX: 32.63 KG/M2 | TEMPERATURE: 98 F | WEIGHT: 262.4 LBS | SYSTOLIC BLOOD PRESSURE: 137 MMHG | HEIGHT: 75 IN

## 2022-03-03 DIAGNOSIS — C80.1 MALIGNANT SPINDLE CELL NEOPLASM (HCC): Primary | ICD-10-CM

## 2022-03-03 DIAGNOSIS — C80.1 MALIGNANT TUMOR, SPINDLE CELL TYPE (HCC): ICD-10-CM

## 2022-03-03 PROCEDURE — 99213 OFFICE O/P EST LOW 20 MIN: CPT | Performed by: SURGERY

## 2022-03-03 RX ORDER — ALLOPURINOL 100 MG/1
100 TABLET ORAL DAILY
COMMUNITY
End: 2022-07-21

## 2022-07-21 PROBLEM — M19.011 ARTHROSIS OF RIGHT ACROMIOCLAVICULAR JOINT: Status: ACTIVE | Noted: 2022-07-21

## 2022-07-21 PROBLEM — M75.121 COMPLETE TEAR OF RIGHT ROTATOR CUFF: Status: ACTIVE | Noted: 2022-07-21

## 2022-07-21 PROBLEM — M67.921 BICEPS TENDINOPATHY, RIGHT: Status: ACTIVE | Noted: 2022-07-21

## 2022-07-21 PROBLEM — C43.70: Status: ACTIVE | Noted: 2022-07-21

## 2022-07-21 PROBLEM — M75.41 IMPINGEMENT SYNDROME OF SHOULDER, RIGHT: Status: ACTIVE | Noted: 2022-07-21

## 2022-07-21 PROBLEM — C49.9 SARCOMA OF SOFT TISSUE (HCC): Status: ACTIVE | Noted: 2022-07-21

## 2022-07-21 RX ORDER — METOPROLOL SUCCINATE 50 MG/1
50 TABLET, EXTENDED RELEASE ORAL DAILY
COMMUNITY
End: 2022-09-13

## 2022-07-21 RX ORDER — METOPROLOL TARTRATE 100 MG/1
1 TABLET ORAL
COMMUNITY
End: 2022-09-13

## 2022-07-21 RX ORDER — ASPIRIN 81 MG/1
81 TABLET ORAL
COMMUNITY

## 2022-07-21 RX ORDER — ASPIRIN 81 MG/1
81 TABLET ORAL DAILY
COMMUNITY
End: 2022-09-13

## 2022-07-21 RX ORDER — ALLOPURINOL 100 MG/1
100 TABLET ORAL DAILY
COMMUNITY
Start: 2022-02-14 | End: 2022-07-21

## 2022-07-21 RX ORDER — METOPROLOL TARTRATE 50 MG/1
1 TABLET, FILM COATED ORAL
COMMUNITY
End: 2022-09-13

## 2022-07-21 RX ORDER — LISINOPRIL AND HYDROCHLOROTHIAZIDE 25; 20 MG/1; MG/1
1 TABLET ORAL DAILY
COMMUNITY
End: 2022-09-13

## 2022-07-21 RX ORDER — ALLOPURINOL 300 MG/1
TABLET ORAL
COMMUNITY
Start: 2022-07-13

## 2022-07-21 RX ORDER — LISINOPRIL 20 MG/1
1 TABLET ORAL
COMMUNITY
End: 2022-09-13

## 2022-07-21 RX ORDER — LISINOPRIL AND HYDROCHLOROTHIAZIDE 20; 12.5 MG/1; MG/1
TABLET ORAL
COMMUNITY
End: 2022-09-13

## 2022-09-06 ENCOUNTER — HOSPITAL ENCOUNTER (OUTPATIENT)
Dept: CT IMAGING | Age: 72
Discharge: HOME | End: 2022-09-06
Payer: MEDICARE

## 2022-09-06 DIAGNOSIS — C80.1 MALIGNANT SPINDLE CELL NEOPLASM (HCC): ICD-10-CM

## 2022-09-06 DIAGNOSIS — C80.1 MALIGNANT TUMOR, SPINDLE CELL TYPE (HCC): ICD-10-CM

## 2022-09-06 LAB
GFR AFRICAN AMERICAN: >60
GFR NON-AFRICAN AMERICAN: >60
PERFORMED ON: NORMAL
POC CREATININE: 0.8 MG/DL (ref 0.8–1.3)
POC SAMPLE TYPE: NORMAL

## 2022-09-06 PROCEDURE — 71260 CT THORAX DX C+: CPT

## 2022-09-06 PROCEDURE — 82565 ASSAY OF CREATININE: CPT

## 2022-09-06 PROCEDURE — 6360000004 HC RX CONTRAST MEDICATION: Performed by: SURGERY

## 2022-09-06 RX ADMIN — IOPAMIDOL 75 ML: 755 INJECTION, SOLUTION INTRAVENOUS at 09:23

## 2022-09-13 ENCOUNTER — OFFICE VISIT (OUTPATIENT)
Dept: SURGERY | Age: 72
End: 2022-09-13
Payer: MEDICARE

## 2022-09-13 VITALS
WEIGHT: 256.4 LBS | HEIGHT: 75 IN | BODY MASS INDEX: 31.88 KG/M2 | DIASTOLIC BLOOD PRESSURE: 75 MMHG | SYSTOLIC BLOOD PRESSURE: 141 MMHG | HEART RATE: 57 BPM | TEMPERATURE: 97 F

## 2022-09-13 DIAGNOSIS — C80.1 MALIGNANT SPINDLE CELL NEOPLASM (HCC): Primary | ICD-10-CM

## 2022-09-13 PROCEDURE — 1123F ACP DISCUSS/DSCN MKR DOCD: CPT | Performed by: SURGERY

## 2022-09-13 PROCEDURE — 99213 OFFICE O/P EST LOW 20 MIN: CPT | Performed by: SURGERY

## 2022-09-13 NOTE — PROGRESS NOTES
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 S/P Scalp sarcoma resection possible full thickness skin graft 1/15/21. He continues doing well. He denies new subcutaneous mass, headache, bone pain, cough, abdominal pain, or suspicious new lesions. Patient is being followed by Dr. Srinivas Padron. No other complaints. Review of systems is otherwise negative. Patient endorses bilateral foot selling with tenderness. Past medical history, Past surgical history, Social history, Family history and allergies reviewed and updated. Vitals:    09/13/22 0952   BP: (!) 141/75   Site: Right Upper Arm   Position: Sitting   Pulse: 57   Temp: 97 °F (36.1 °C)   TempSrc: Temporal   Weight: 256 lb 6.4 oz (116.3 kg)   Height: 6' 3\" (1.905 m)     Wt Readings from Last 3 Encounters:   09/13/22 256 lb 6.4 oz (116.3 kg)   03/03/22 262 lb 6.4 oz (119 kg)   08/31/21 253 lb 3.2 oz (114.9 kg)     Body mass index is 32.05 kg/m². O/E:   Constitutional: Patient is oriented to person, place, and time. No distress. HENT: mucus membranes - moist. No scleral icterus. Incisional scar present to frotnal scalp. Neck: Supple and normal range of motion. No bilateral lymphadenopathy present. Pulmonary/Chest: Effort normal. No respiratory distress. Abdominal: Soft. No distension and no mass. No tenderness. No Hepatosplenomegaly. Extremities: no edema and no tenderness. Neurological: Grossly intact motor and sensory exam  Skin: Skin is warm and dry. No rash noted. He is not diaphoretic.   Left back scalp and left ear area, (Temporal) prominent area noticed  Surgical site:    Incision normal: Yes   Surrounding nodularity: No   Abnormal pigmentation: No   Other lesions: No  Lymph nodes: Palpable regional lymph nodes: No  Psychiatric: He has a normal mood and affect. His behavior is normal. Judgment and thought content normal.    CT Result 9/6/22:  No suspicious findings to suggest metastatic disease. Mild coronary artery calcification. Fatty infiltration of the liver. Benign nodularity of the left adrenal gland. A/P:    Diagnosis Orders   1. 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 The chance of developing a new melanoma is present and thus importance of follow up with dermatologist was reviewed. Symptoms and signs of recurrence reviewed including headache, jaundice, cough, abdominal pain and blood in stools. Will see Dr. Elvera Brunner in January    Blane expressed interest in genetic testing given that he has personal history of different types of cancer and he has 3 sons. Discussed possibility of seeking second opinion from Dr. Joseluis Nieves with [facility] , also offered to check genetic testing. Follow up with me in 6 months or sooner if any symptoms/problems.      John Bean MD  Surgery Attending

## 2023-01-19 RX ORDER — ASPIRIN 81 MG/1
81 TABLET ORAL
COMMUNITY

## 2023-01-19 RX ORDER — FLUOROURACIL 50 MG/G
CREAM TOPICAL
COMMUNITY
Start: 2022-12-15

## 2023-01-19 RX ORDER — LISINOPRIL 20 MG/1
1 TABLET ORAL
COMMUNITY

## 2023-11-08 ENCOUNTER — HOSPITAL ENCOUNTER (OUTPATIENT)
Dept: CT IMAGING | Age: 73
Discharge: HOME | End: 2023-11-08
Attending: INTERNAL MEDICINE
Payer: MEDICARE

## 2023-11-08 DIAGNOSIS — C49.0 MALIGNANT NEOPLASM OF CONNECTIVE AND SOFT TISSUE OF HEAD, FACE, AND NECK (HCC): ICD-10-CM

## 2023-11-08 DIAGNOSIS — Z85.89 PERSONAL HISTORY OF MALIGNANT NEOPLASM OF OTHER ORGANS AND SYSTEMS: ICD-10-CM

## 2023-11-08 DIAGNOSIS — C91.41 HAIRY CELL LEUKEMIA, IN REMISSION (HCC): ICD-10-CM

## 2023-11-08 DIAGNOSIS — C44.92 SQUAMOUS CELL CARCINOMA OF SKIN: ICD-10-CM

## 2023-11-08 LAB
PERFORMED ON: NORMAL
POC CREATININE: 0.9 MG/DL (ref 0.8–1.3)
POC SAMPLE TYPE: NORMAL

## 2023-11-08 PROCEDURE — 2500000003 HC RX 250 WO HCPCS: Performed by: INTERNAL MEDICINE

## 2023-11-08 PROCEDURE — 74177 CT ABD & PELVIS W/CONTRAST: CPT

## 2023-11-08 PROCEDURE — 6360000004 HC RX CONTRAST MEDICATION: Performed by: INTERNAL MEDICINE

## 2023-11-08 PROCEDURE — 82565 ASSAY OF CREATININE: CPT

## 2023-11-08 RX ADMIN — IOPAMIDOL 75 ML: 755 INJECTION, SOLUTION INTRAVENOUS at 15:07

## 2023-11-08 RX ADMIN — BARIUM SULFATE 900 ML: 20 SUSPENSION ORAL at 15:08

## (undated) DEVICE — SUTURE VCRL SZ 3-0 L18IN ABSRB UD L26MM SH 1/2 CIR J864D

## (undated) DEVICE — ELECTROSURGICAL PENCIL ROCKER SWITCH NON COATED BLADE ELECTRODE 10 FT (3 M) CORD HOLSTER: Brand: MEGADYNE

## (undated) DEVICE — SPONGE,LAP,18"X18",DLX,XR,ST,5/PK,40/PK: Brand: MEDLINE

## (undated) DEVICE — SPONGE GZ W4XL8IN COT WVN 12 PLY

## (undated) DEVICE — STANDARD HYPODERMIC NEEDLE,POLYPROPYLENE HUB: Brand: MONOJECT

## (undated) DEVICE — COVER LT HNDL BLU PLAS

## (undated) DEVICE — STRIP,CLOSURE,WOUND,MEDI-STRIP,1/2X4: Brand: MEDLINE

## (undated) DEVICE — APPLICATOR MEDICATED 26 CC SOLUTION HI LT ORNG CHLORAPREP

## (undated) DEVICE — DRESSING PETRO W3XL8IN N ADH KNIT CELOS ACETT ADPTC

## (undated) DEVICE — DRESSING NEG PRSS W7.7XL11.2CM D1.75CM SM 2 SPRL GRANUFOAM

## (undated) DEVICE — TRAY PREP DRY W/ PREM GLV 2 APPL 6 SPNG 2 UNDPD 1 OVERWRAP

## (undated) DEVICE — DRESSING PETRO W3XL8IN OIL EMUL N ADH GZ KNIT IMPREG CELOS

## (undated) DEVICE — SUTURE MCRYL SZ 4-0 L27IN ABSRB UD L19MM PS-2 1/2 CIR PRIM Y426H

## (undated) DEVICE — SUTURE CHROMIC GUT SZ 4-0 L18IN ABSRB BRN L13MM P-3 3/8 CIR 1654G

## (undated) DEVICE — JEWISH HOSPITAL TURNOVER KIT: Brand: MEDLINE INDUSTRIES, INC.

## (undated) DEVICE — SURGICAL SET UP - SURE SET: Brand: MEDLINE INDUSTRIES, INC.

## (undated) DEVICE — PLATE ES AD W 9FT CRD 2

## (undated) DEVICE — E-Z CLEAN, NON-STICK, PTFE COATED, ELECTROSURGICAL BLADE ELECTRODE, MODIFIED EXTENDED INSULATION, 2.5 INCH (6.35 CM): Brand: MEGADYNE

## (undated) DEVICE — GLOVE SURG SZ 7 L12IN FNGR THK75MIL WHT LTX POLYMER BEAD

## (undated) DEVICE — STAPLER SKIN H3.9MM WIRE DIA0.58MM CRWN 6.9MM 35 STPL ROT

## (undated) DEVICE — STERILE POLYISOPRENE POWDER-FREE SURGICAL GLOVES WITH EMOLLIENT COATING: Brand: PROTEXIS

## (undated) DEVICE — INTENDED FOR TISSUE SEPARATION, AND OTHER PROCEDURES THAT REQUIRE A SHARP SURGICAL BLADE TO PUNCTURE OR CUT.: Brand: BARD-PARKER ® CARBON RIB-BACK BLADES

## (undated) DEVICE — KIT NEG PRSS FOR NONLIN OR UPTO 90CM LIN INCIS PREVENA +

## (undated) DEVICE — PACK,EENT,TURBAN DRAPE,PK II: Brand: MEDLINE

## (undated) DEVICE — SYSTEM SKIN CLSR 22CM DERMBND PRINEO